# Patient Record
Sex: FEMALE | Race: WHITE | NOT HISPANIC OR LATINO | Employment: OTHER | URBAN - METROPOLITAN AREA
[De-identification: names, ages, dates, MRNs, and addresses within clinical notes are randomized per-mention and may not be internally consistent; named-entity substitution may affect disease eponyms.]

---

## 2021-05-17 ENCOUNTER — CLINICAL SUPPORT (OUTPATIENT)
Dept: URGENT CARE | Facility: CLINIC | Age: 56
End: 2021-05-17
Payer: COMMERCIAL

## 2021-05-17 DIAGNOSIS — Z71.84 ENCOUNTER FOR COUNSELING FOR TRAVEL: Primary | ICD-10-CM

## 2021-05-17 PROCEDURE — 99211 PR OFFICE/OUTPT VISIT, EST, LEVL I: ICD-10-PCS | Mod: S$GLB,,, | Performed by: EMERGENCY MEDICINE

## 2021-05-17 PROCEDURE — U0005 INFEC AGEN DETEC AMPLI PROBE: HCPCS | Performed by: EMERGENCY MEDICINE

## 2021-05-17 PROCEDURE — 99211 OFF/OP EST MAY X REQ PHY/QHP: CPT | Mod: S$GLB,,, | Performed by: EMERGENCY MEDICINE

## 2021-05-17 PROCEDURE — U0003 INFECTIOUS AGENT DETECTION BY NUCLEIC ACID (DNA OR RNA); SEVERE ACUTE RESPIRATORY SYNDROME CORONAVIRUS 2 (SARS-COV-2) (CORONAVIRUS DISEASE [COVID-19]), AMPLIFIED PROBE TECHNIQUE, MAKING USE OF HIGH THROUGHPUT TECHNOLOGIES AS DESCRIBED BY CMS-2020-01-R: HCPCS | Performed by: EMERGENCY MEDICINE

## 2021-05-18 LAB — SARS-COV-2 RNA RESP QL NAA+PROBE: NOT DETECTED

## 2021-06-26 ENCOUNTER — CLINICAL SUPPORT (OUTPATIENT)
Dept: URGENT CARE | Facility: CLINIC | Age: 56
End: 2021-06-26
Payer: COMMERCIAL

## 2021-06-26 DIAGNOSIS — Z20.828 EXPOSURE TO SARS-ASSOCIATED CORONAVIRUS: Primary | ICD-10-CM

## 2021-06-26 LAB
CTP QC/QA: YES
SARS-COV-2 RDRP RESP QL NAA+PROBE: NEGATIVE

## 2021-06-26 PROCEDURE — 99211 OFF/OP EST MAY X REQ PHY/QHP: CPT | Mod: S$GLB,CS,, | Performed by: PHYSICIAN ASSISTANT

## 2021-06-26 PROCEDURE — U0002 COVID-19 LAB TEST NON-CDC: HCPCS | Mod: QW,S$GLB,, | Performed by: PHYSICIAN ASSISTANT

## 2021-06-26 PROCEDURE — 99211 PR OFFICE/OUTPT VISIT, EST, LEVL I: ICD-10-PCS | Mod: S$GLB,CS,, | Performed by: PHYSICIAN ASSISTANT

## 2021-06-26 PROCEDURE — U0002: ICD-10-PCS | Mod: QW,S$GLB,, | Performed by: PHYSICIAN ASSISTANT

## 2021-08-04 ENCOUNTER — OFFICE VISIT (OUTPATIENT)
Dept: URGENT CARE | Facility: CLINIC | Age: 56
End: 2021-08-04
Payer: COMMERCIAL

## 2021-08-04 VITALS
SYSTOLIC BLOOD PRESSURE: 120 MMHG | OXYGEN SATURATION: 98 % | BODY MASS INDEX: 23.7 KG/M2 | WEIGHT: 151 LBS | HEIGHT: 67 IN | RESPIRATION RATE: 18 BRPM | HEART RATE: 60 BPM | DIASTOLIC BLOOD PRESSURE: 64 MMHG | TEMPERATURE: 98 F

## 2021-08-04 DIAGNOSIS — R09.81 NASAL CONGESTION: Primary | ICD-10-CM

## 2021-08-04 LAB
CTP QC/QA: YES
SARS-COV-2 RDRP RESP QL NAA+PROBE: NEGATIVE

## 2021-08-04 PROCEDURE — 3008F BODY MASS INDEX DOCD: CPT | Mod: CPTII,S$GLB,, | Performed by: PHYSICIAN ASSISTANT

## 2021-08-04 PROCEDURE — 1159F MED LIST DOCD IN RCRD: CPT | Mod: CPTII,S$GLB,, | Performed by: PHYSICIAN ASSISTANT

## 2021-08-04 PROCEDURE — U0002 COVID-19 LAB TEST NON-CDC: HCPCS | Mod: QW,S$GLB,, | Performed by: PHYSICIAN ASSISTANT

## 2021-08-04 PROCEDURE — 3008F PR BODY MASS INDEX (BMI) DOCUMENTED: ICD-10-PCS | Mod: CPTII,S$GLB,, | Performed by: PHYSICIAN ASSISTANT

## 2021-08-04 PROCEDURE — 3078F DIAST BP <80 MM HG: CPT | Mod: CPTII,S$GLB,, | Performed by: PHYSICIAN ASSISTANT

## 2021-08-04 PROCEDURE — 3074F PR MOST RECENT SYSTOLIC BLOOD PRESSURE < 130 MM HG: ICD-10-PCS | Mod: CPTII,S$GLB,, | Performed by: PHYSICIAN ASSISTANT

## 2021-08-04 PROCEDURE — 3074F SYST BP LT 130 MM HG: CPT | Mod: CPTII,S$GLB,, | Performed by: PHYSICIAN ASSISTANT

## 2021-08-04 PROCEDURE — 1160F RVW MEDS BY RX/DR IN RCRD: CPT | Mod: CPTII,S$GLB,, | Performed by: PHYSICIAN ASSISTANT

## 2021-08-04 PROCEDURE — 3078F PR MOST RECENT DIASTOLIC BLOOD PRESSURE < 80 MM HG: ICD-10-PCS | Mod: CPTII,S$GLB,, | Performed by: PHYSICIAN ASSISTANT

## 2021-08-04 PROCEDURE — 99213 OFFICE O/P EST LOW 20 MIN: CPT | Mod: S$GLB,,, | Performed by: PHYSICIAN ASSISTANT

## 2021-08-04 PROCEDURE — 1160F PR REVIEW ALL MEDS BY PRESCRIBER/CLIN PHARMACIST DOCUMENTED: ICD-10-PCS | Mod: CPTII,S$GLB,, | Performed by: PHYSICIAN ASSISTANT

## 2021-08-04 PROCEDURE — U0002: ICD-10-PCS | Mod: QW,S$GLB,, | Performed by: PHYSICIAN ASSISTANT

## 2021-08-04 PROCEDURE — 1159F PR MEDICATION LIST DOCUMENTED IN MEDICAL RECORD: ICD-10-PCS | Mod: CPTII,S$GLB,, | Performed by: PHYSICIAN ASSISTANT

## 2021-08-04 PROCEDURE — 99213 PR OFFICE/OUTPT VISIT, EST, LEVL III, 20-29 MIN: ICD-10-PCS | Mod: S$GLB,,, | Performed by: PHYSICIAN ASSISTANT

## 2021-08-04 RX ORDER — FERROUS SULFATE, DRIED 160(50) MG
1 TABLET, EXTENDED RELEASE ORAL DAILY
COMMUNITY

## 2021-11-19 ENCOUNTER — IMMUNIZATION (OUTPATIENT)
Dept: PRIMARY CARE CLINIC | Facility: CLINIC | Age: 56
End: 2021-11-19
Payer: COMMERCIAL

## 2021-11-19 DIAGNOSIS — Z23 NEED FOR VACCINATION: Primary | ICD-10-CM

## 2021-11-19 PROCEDURE — 0064A COVID-19, MRNA, LNP-S, PF, 100 MCG/0.25 ML DOSE VACCINE (MODERNA BOOSTER): CPT | Mod: CV19,PBBFAC | Performed by: FAMILY MEDICINE

## 2021-11-29 ENCOUNTER — OFFICE VISIT (OUTPATIENT)
Dept: URGENT CARE | Facility: CLINIC | Age: 56
End: 2021-11-29
Payer: COMMERCIAL

## 2021-11-29 VITALS
RESPIRATION RATE: 14 BRPM | HEART RATE: 65 BPM | DIASTOLIC BLOOD PRESSURE: 67 MMHG | HEIGHT: 67 IN | TEMPERATURE: 98 F | BODY MASS INDEX: 23.54 KG/M2 | OXYGEN SATURATION: 96 % | SYSTOLIC BLOOD PRESSURE: 133 MMHG | WEIGHT: 150 LBS

## 2021-11-29 DIAGNOSIS — R09.81 SINUS CONGESTION: ICD-10-CM

## 2021-11-29 DIAGNOSIS — J06.9 VIRAL URI: Primary | ICD-10-CM

## 2021-11-29 LAB
CTP QC/QA: YES
SARS-COV-2 RDRP RESP QL NAA+PROBE: NEGATIVE

## 2021-11-29 PROCEDURE — U0002 COVID-19 LAB TEST NON-CDC: HCPCS | Mod: QW,S$GLB,, | Performed by: NURSE PRACTITIONER

## 2021-11-29 PROCEDURE — 99213 PR OFFICE/OUTPT VISIT, EST, LEVL III, 20-29 MIN: ICD-10-PCS | Mod: S$GLB,,, | Performed by: NURSE PRACTITIONER

## 2021-11-29 PROCEDURE — 99213 OFFICE O/P EST LOW 20 MIN: CPT | Mod: S$GLB,,, | Performed by: NURSE PRACTITIONER

## 2021-11-29 PROCEDURE — U0002: ICD-10-PCS | Mod: QW,S$GLB,, | Performed by: NURSE PRACTITIONER

## 2021-12-02 ENCOUNTER — TELEPHONE (OUTPATIENT)
Dept: URGENT CARE | Facility: CLINIC | Age: 56
End: 2021-12-02
Payer: COMMERCIAL

## 2022-05-10 ENCOUNTER — IMMUNIZATION (OUTPATIENT)
Dept: PHARMACY | Facility: CLINIC | Age: 57
End: 2022-05-10

## 2022-05-10 DIAGNOSIS — Z23 NEED FOR VACCINATION: Primary | ICD-10-CM

## 2025-01-03 ENCOUNTER — OFFICE VISIT (OUTPATIENT)
Dept: URGENT CARE | Facility: CLINIC | Age: 60
End: 2025-01-03
Payer: COMMERCIAL

## 2025-01-03 VITALS
DIASTOLIC BLOOD PRESSURE: 64 MMHG | TEMPERATURE: 99 F | BODY MASS INDEX: 23.94 KG/M2 | RESPIRATION RATE: 16 BRPM | HEART RATE: 70 BPM | OXYGEN SATURATION: 97 % | SYSTOLIC BLOOD PRESSURE: 139 MMHG | WEIGHT: 152.56 LBS | HEIGHT: 67 IN

## 2025-01-03 DIAGNOSIS — J10.1 INFLUENZA A: Primary | ICD-10-CM

## 2025-01-03 DIAGNOSIS — R05.9 COUGH, UNSPECIFIED TYPE: ICD-10-CM

## 2025-01-03 DIAGNOSIS — R68.83 CHILLS (WITHOUT FEVER): ICD-10-CM

## 2025-01-03 DIAGNOSIS — R09.81 NASAL CONGESTION: ICD-10-CM

## 2025-01-03 PROBLEM — E28.319 EARLY MENOPAUSE: Status: ACTIVE | Noted: 2025-01-03

## 2025-01-03 PROBLEM — M85.80 OSTEOPENIA: Status: ACTIVE | Noted: 2025-01-03

## 2025-01-03 LAB
CTP QC/QA: YES
CTP QC/QA: YES
POC MOLECULAR INFLUENZA A AGN: POSITIVE
POC MOLECULAR INFLUENZA B AGN: ABNORMAL
SARS-COV-2 AG RESP QL IA.RAPID: NEGATIVE

## 2025-01-03 RX ORDER — BALOXAVIR MARBOXIL 40 MG/1
40 TABLET, FILM COATED ORAL ONCE
Qty: 1 TABLET | Refills: 0 | Status: SHIPPED | OUTPATIENT
Start: 2025-01-03 | End: 2025-01-03

## 2025-01-03 NOTE — PROGRESS NOTES
"Subjective:      Patient ID: Annita Quintanilla is a 59 y.o. female.    Vitals:  height is 5' 7" (1.702 m) and weight is 69.2 kg (152 lb 8.9 oz). Her oral temperature is 98.9 °F (37.2 °C). Her blood pressure is 139/64 and her pulse is 70. Her respiration is 16 and oxygen saturation is 97%.     Chief Complaint: Sinus Problem    Patient present for sinus problem. Symptoms started on Tuesday including nasal congestion, bilateral ear pain, chills, fatigue and cough. Administered Advil and Mucinex DM with some relief. No fever but says her temperature usually runs lower than average. Denies sob/wheezing, cp, n/v/d.     Sinus Problem  This is a new problem. The current episode started in the past 7 days. The problem has been gradually worsening since onset. There has been no fever. Her pain is at a severity of 7/10. The pain is moderate. Associated symptoms include chills, congestion, coughing, ear pain, headaches, sinus pressure and swollen glands. Pertinent negatives include no diaphoresis, hoarse voice, neck pain, shortness of breath, sneezing or sore throat. Past treatments include oral decongestants. The treatment provided no relief.       Constitution: Positive for chills and fatigue. Negative for sweating and fever.   HENT:  Positive for ear pain, congestion and sinus pressure. Negative for sore throat.    Neck: Negative for neck pain.   Cardiovascular: Negative.    Respiratory:  Positive for cough. Negative for shortness of breath and wheezing.    Gastrointestinal: Negative.    Skin: Negative.    Allergic/Immunologic: Negative for sneezing.   Neurological:  Positive for headaches. Negative for dizziness.      Objective:     Physical Exam   Constitutional: She appears well-developed.  Non-toxic appearance. She does not appear ill. No distress.   HENT:   Head: Normocephalic and atraumatic.   Ears:   Right Ear: Tympanic membrane, external ear and ear canal normal.   Left Ear: Tympanic membrane, external ear and ear " canal normal.   Nose: Congestion present.   Mouth/Throat: Uvula is midline, oropharynx is clear and moist and mucous membranes are normal. Mucous membranes are moist. Oropharynx is clear.   Eyes: Conjunctivae and EOM are normal.   Neck: Neck supple.   Cardiovascular: Normal rate and regular rhythm.   Pulmonary/Chest: Effort normal and breath sounds normal.   Abdominal: Normal appearance.   Musculoskeletal: Normal range of motion.         General: Normal range of motion.   Lymphadenopathy:     She has no cervical adenopathy.   Neurological: no focal deficit. She is alert. She displays no weakness. Gait normal.   Skin: Skin is warm, dry, not diaphoretic, not pale and no rash.   Psychiatric: Her behavior is normal.     Results for orders placed or performed in visit on 01/03/25   POCT Influenza A/B Molecular    Collection Time: 01/03/25  8:20 AM   Result Value Ref Range    POC Molecular Influenza A Ag Positive (A) Negative    POC Molecular Influenza B Ag Not Reported Negative     Acceptable Yes    SARS Coronavirus 2 Antigen, POCT Manual Read    Collection Time: 01/03/25  8:26 AM   Result Value Ref Range    SARS Coronavirus 2 Antigen Negative Negative     Acceptable Yes          Assessment:     1. Influenza A    2. Chills (without fever)    3. Cough, unspecified type    4. Nasal congestion        Plan:     Positive for Flu A.  Discussed with patient that Tamiflu or Xofluza is optional and also discussed common side effects. Pt opted for Xofluza. Made note to pharmacy that this can be substituted for Tamiflu if needed.  Fever control with Tylenol or ibuprofen.  Stay home until fever free for 24 hours without medications.  Over-the-counter medications as needed for cold symptoms- discussed using night time cold medication qhs to help control cough.   Rest and drink plenty of fluids. Follow-up with PCP if symptoms worsen or fail to improve.     Influenza A  -     baloxavir marboxiL (XOFLUZA)  40 mg tablet; Take 1 tablet (40 mg total) by mouth once. for 1 dose  Dispense: 1 tablet; Refill: 0    Chills (without fever)  -     POCT Influenza A/B Molecular  -     SARS Coronavirus 2 Antigen, POCT Manual Read    Cough, unspecified type  -     POCT Influenza A/B Molecular  -     SARS Coronavirus 2 Antigen, POCT Manual Read    Nasal congestion  -     POCT Influenza A/B Molecular  -     SARS Coronavirus 2 Antigen, POCT Manual Read

## 2025-01-03 NOTE — LETTER
January 3, 2025      [facility]  [id number] [place] [address] [address]  [facility] [address]  Phone: [phone]       Patient: Annita Quintanilla   YOB: 1965  Date of Visit: 01/03/2025    To Whom It May Concern:    Paola Quintanilla  was at [facility] on 01/03/2025. The patient may return to work/school on 1/6/25 with no restrictions. If you have any questions or concerns, or if I can be of further assistance, please do not hesitate to contact me.    Sincerely,    Cassandra Berg PA-C

## 2025-01-03 NOTE — PATIENT INSTRUCTIONS
General Instructions for Upper Respiratory Infection (URI):    What is a URI?   An upper respiratory infection (URI), also known as the common cold, is an acute infection of the head and chest. Generally, it affects the nose, throat, airways, sinuses and/or ears. URIs are typically caused by a virus and are among the most common diagnoses in Urgent Care.   While COVID and Flu are viruses most commonly tested for in Urgent Care, there are many other viruses that can cause similar symptoms such as: Respiratory Syncytial virus (RSV), Rhinovirus, Adenovirus, Enterovirus, Petros-Barr virus (EBV), human meta pneumovirus, Parvovirus B19 (Fifth's disease).     How long will it last?   Probably longer than youd like. Symptoms usually worsen during the first 3-5 days, followed by gradual improvement. Most URIs resolve within 10-14 days, even without treatment. People with URIs are most contagious during the first 2-3 days of symptoms and rarely after 1 week. However, a person can remain contagious for several days after symptoms subside.     Treatment   Antibiotics only work on bacterial infections, and will not treat a virus. Because URIs usually are caused by viruses, antibiotics are not an effective treatment.  If taken when not needed, antibiotics can be harmful and can expose you to unnecessary side effects such as allergic reaction (rash, anaphylaxis), yeast infection, nausea, vomiting, diarrhea, Clostridioides difficile (C. diff), immune dysregulation, longer illness and antibiotic resistance. Fortunately, there are many options that help alleviate symptoms when used as directed. The treatments below can help you feel better while your body's own defenses are fighting the virus.    Fever and/or Pain:    Use a pain reliever, such as acetaminophen (Tylenol) or Ibuprofen (Advil). Avoid NSAIDs (Ibuprofen, Aleve, Motrin, Aspirin) if you are pregnant, have advanced kidney disease or history of stomach ulcers/bleeding.      "Dosages listed below are recommended for the average size adult       Acetaminophen (Tylenol): 500mg-650mg every 4 hours, not to exceed 4000mg in 24 hours       Ibuprofen (Advil, Motrin): 400mg-600mg every 6 hours (take with food or eat at least 30 minutes before taking medication)    Nasal congestion, post nasal dripping, or sinus pressure:    Use an oral decongestant, such as pseudoephedrine or Mucinex D to reduce nasal and sinus congestion. Decongestants are available at pharmacies. Decongestants should not be used by individuals with certain medical conditions such as hypertension (high blood pressure) or any history of heart palpitations. If you DO have Hypertension or palpitations, it is safe to take Coricidin HBP for relief of sinus symptoms.   Nasal sprays, such as fluticasone (Flonase), can help relief sneezing, runny nose and nasal congestion, and may take up to one week of consistent use to manage symptoms.     Nasal rinsing with saline nasal spray or salt water (e.g., neti pot) can help relieve nasal dryness.    Use a warm mist humidifier or take a steamy shower to increase moisture in the air and soothe oral and nasal tissues that become irritated with dry air.    Non-drowsy antihistamines during the day, such as Zyrtec, Claritin, Allegra may help with runny nose, post nasal drip, and sneezing. Benadryl can be used at night as needed, unless you are already taking a "night-time" cold medication.   Vicks vapor rub may be helpful to use at night.    Zantac will help if there is reflux from the post nasal drip and helpful to take at night.    Sore throat:    Use a pain reliever, such as acetaminophen (Tylenol) or Ibuprofen (Advil).    Gargle with salt water (1/2 tsp of salt dissolved in 8 oz of warm water). Salt water can be used as often as you like.    Throat lozenges or throat sprays (Cepacol lozenges or Chloroseptic spray) may bring some relief by increasing saliva production and lubricating your " throat.    Drink plenty of fluids (e.g., water, diluted juice, tea) to soothe your throat and to stay well hydrated. Honey/lemon water or warm tea may be soothing.    Coughing:    Coughing often occurs during the later stages of a URI. It may be dry or produce phlegm or mucus.    Medications that contain dextromethorphan (helps to suppress a cough) and/or Guaifenesin (thins mucus and relieves chest congestion). Examples that include both are Robitussin DM, Mucinex DM, Delsym. Guaifenesin works best when you drink plenty of water.     Tea with honey, when taken regularly, can soothe a sore throat and help suppress a cough.    Cough drops   Vicks vapor rub and/or humidifier at night    Take care with medications    Be familiar with the individual ingredients in every medication you take, so you treat your symptoms appropriately.    Watch for ingredient overlap between products (e.g., acetaminophen, ibuprofen, pseudoephedrine). Dont accidentally take too much of any ingredient.    Dont take medications longer than recommended.    Follow any specific instructions given by your health care provider. This is especially important if you have an underlying health condition.    If you have questions or concerns, ask a pharmacist for assistance or consult with your health care provider. Note: generic medications contain the same active ingredients as name brands.     Strengthen your immune system   Make sure you eat well (e.g., a healthy, balanced variety of foods).    Avoid alcohol as it can directly suppress various immune responses.    Stay away from cigarettes, and second hand smoke.    Rest as much as possible and get plenty of sleep (at least 8 hours).     Cold-eeze (Zinc), Elderberry, Emergen-C, may help to reduce the duration of URI symptoms if taken early.    Reduce risk of spreading URI to others    URI infections are contagious; help reduce the spread.    Wash your hands frequently and/or use a hand ,  especially after touching your face or covering cough.    Cover your mouth when coughing or sneezing.    Avoid sharing items like cups and lip balm.     When to seek help    Sometimes upper respiratory infections become worse instead of better. Secondary bacterial infections or other complications can develop that require further treatment. Dont delay seeking medical evaluation if you experience any of the following symptoms:    A fever greater than 101°F for longer than 3 days.    Breathing problems like feeling short of breath, having pain or tightness in your chest, or wheezing (high pitched whistling sounds when you breath in)    A cough that is painful, worsening, or lasting longer than two weeks.    A bad sore throat that lasts longer than three days, or worsens.    Very swollen glands in your neck that arent going away    Pain in your face or teeth that is not improving after a few days of decongestant use    A headache that lasts longer than a few days or is very severe    A new rash    Persistent abdominal pain    Inability to tolerate oral fluids without vomiting   Severe weakness, dizziness, or passing out   Significant drowsiness or confusion     Please follow up with your primary care provider if your signs and symptoms have not resolved after 7-10 days or sooner if symptoms worsen.   If your condition worsens or fails to improve we recommend that you receive another evaluation at the emergency  room immediately or contact your primary medical clinic to discuss your concerns.   You must understand that you have received Urgent Care treatment only and that you may be released before all of  your medical problems are known or treated. You, the patient, are responsible to arrange for follow up care as instructed.    More information    Medicine Plus: nlm.nih.gov/medlineplus/ commoncold.html    Centers for Disease Control &Prevention: cdc.gov/getsmart/community/ materials-references/print-materials/  hcp/adult-tract-infection.pdf